# Patient Record
Sex: FEMALE | Race: WHITE | Employment: FULL TIME | URBAN - METROPOLITAN AREA
[De-identification: names, ages, dates, MRNs, and addresses within clinical notes are randomized per-mention and may not be internally consistent; named-entity substitution may affect disease eponyms.]

---

## 2018-10-13 ENCOUNTER — HOSPITAL ENCOUNTER (EMERGENCY)
Age: 38
Discharge: HOME | End: 2018-10-13
Payer: COMMERCIAL

## 2018-10-13 ENCOUNTER — APPOINTMENT (OUTPATIENT)
Dept: GENERAL RADIOLOGY | Age: 38
End: 2018-10-13
Payer: COMMERCIAL

## 2018-10-13 VITALS
WEIGHT: 102 LBS | SYSTOLIC BLOOD PRESSURE: 193 MMHG | TEMPERATURE: 98.5 F | DIASTOLIC BLOOD PRESSURE: 73 MMHG | HEIGHT: 63 IN | HEART RATE: 78 BPM | BODY MASS INDEX: 18.07 KG/M2 | RESPIRATION RATE: 14 BRPM | OXYGEN SATURATION: 100 %

## 2018-10-13 DIAGNOSIS — S92.514A CLOSED NONDISPLACED FRACTURE OF PROXIMAL PHALANX OF LESSER TOE OF RIGHT FOOT, INITIAL ENCOUNTER: Primary | ICD-10-CM

## 2018-10-13 PROCEDURE — 99283 EMERGENCY DEPT VISIT LOW MDM: CPT

## 2018-10-13 PROCEDURE — 6370000000 HC RX 637 (ALT 250 FOR IP): Performed by: NURSE PRACTITIONER

## 2018-10-13 PROCEDURE — 73630 X-RAY EXAM OF FOOT: CPT

## 2018-10-13 RX ORDER — IBUPROFEN 800 MG/1
800 TABLET ORAL EVERY 6 HOURS PRN
Qty: 16 TABLET | Refills: 0 | Status: SHIPPED | OUTPATIENT
Start: 2018-10-13 | End: 2021-07-21 | Stop reason: ALTCHOICE

## 2018-10-13 RX ORDER — HYDROCODONE BITARTRATE AND ACETAMINOPHEN 5; 325 MG/1; MG/1
1 TABLET ORAL EVERY 6 HOURS PRN
Qty: 12 TABLET | Refills: 0 | Status: SHIPPED | OUTPATIENT
Start: 2018-10-13 | End: 2018-10-16

## 2018-10-13 RX ORDER — IBUPROFEN 800 MG/1
800 TABLET ORAL ONCE
Status: COMPLETED | OUTPATIENT
Start: 2018-10-13 | End: 2018-10-13

## 2018-10-13 RX ADMIN — IBUPROFEN 800 MG: 800 TABLET ORAL at 11:12

## 2018-10-13 ASSESSMENT — PAIN SCALES - GENERAL
PAINLEVEL_OUTOF10: 10
PAINLEVEL_OUTOF10: 10

## 2018-10-13 ASSESSMENT — PAIN DESCRIPTION - PAIN TYPE: TYPE: ACUTE PAIN

## 2018-10-13 ASSESSMENT — PAIN DESCRIPTION - LOCATION: LOCATION: FOOT

## 2018-10-14 NOTE — ED PROVIDER NOTES
full range with pain. Skin:  tenderness, swelling and ecchymosis. Neurovascular: Motor deficit: none. Sensory deficit:   none. Pulse deficit: none. Capillary refill: normal.  Ankle:               Tenderness:  none. Swelling: None. Deformity: no.             ROM: full range of motion. Skin:  no erythema, rash or wounds noted. Gait:  limp. Lymphatics: No lymphangitis or adenopathy noted. Neurological:  Oriented. Motor functions intact. Lab / Imaging Results   (All laboratory and radiology results have been personally reviewed by myself)  Labs:  No results found for this visit on 10/13/18. Imaging: All Radiology results interpreted by Radiologist unless otherwise noted. XR FOOT RIGHT (MIN 3 VIEWS)   Final Result   Nondisplaced fracture of the proximal phalanx of the fifth   digit. ED Course / Medical Decision Making     Medications   ibuprofen (ADVIL;MOTRIN) tablet 800 mg (800 mg Oral Given 10/13/18 1112)        Consult(s):   none. Procedure(s):   none    Medical Decision Making:    Films were obtained based on high  suspicion for bony injury as per history/physical findings . Plan is subsequently for symptom control, limited use and  immobilization with appropriate outpatient follow-up. .Patient was rey taped and placed in a postop shoe. Patient given crutches and educated on their use. Patient verbalized understanding all questions were answered. Patient nontoxic in appearance no distress. Patient stable for discharge home with outpatient follow-up with podiatry. Counseling: The emergency provider has spoken with the patient and discussed todays results, in addition to providing specific details for the plan of care and counseling regarding the diagnosis and prognosis. Questions are answered at this time and they are agreeable with the plan. Assessment      1.  Closed nondisplaced

## 2018-10-19 ENCOUNTER — OFFICE VISIT (OUTPATIENT)
Dept: ORTHOPEDIC SURGERY | Age: 38
End: 2018-10-19
Payer: COMMERCIAL

## 2018-10-19 VITALS
WEIGHT: 102 LBS | SYSTOLIC BLOOD PRESSURE: 106 MMHG | HEART RATE: 66 BPM | BODY MASS INDEX: 18.07 KG/M2 | HEIGHT: 63 IN | DIASTOLIC BLOOD PRESSURE: 72 MMHG

## 2018-10-19 DIAGNOSIS — S99.921A FOOT INJURY, RIGHT, INITIAL ENCOUNTER: Primary | ICD-10-CM

## 2018-10-19 PROCEDURE — G8484 FLU IMMUNIZE NO ADMIN: HCPCS | Performed by: ORTHOPAEDIC SURGERY

## 2018-10-19 PROCEDURE — 99203 OFFICE O/P NEW LOW 30 MIN: CPT | Performed by: ORTHOPAEDIC SURGERY

## 2018-10-19 PROCEDURE — G8419 CALC BMI OUT NRM PARAM NOF/U: HCPCS | Performed by: ORTHOPAEDIC SURGERY

## 2018-10-19 PROCEDURE — 1036F TOBACCO NON-USER: CPT | Performed by: ORTHOPAEDIC SURGERY

## 2018-10-19 PROCEDURE — G8427 DOCREV CUR MEDS BY ELIG CLIN: HCPCS | Performed by: ORTHOPAEDIC SURGERY

## 2018-10-19 RX ORDER — M-VIT,TX,IRON,MINS/CALC/FOLIC 27MG-0.4MG
1 TABLET ORAL DAILY
COMMUNITY
End: 2018-11-19 | Stop reason: CLARIF

## 2018-10-19 NOTE — PROGRESS NOTES
New Ankle Patient     Referring Provider:   ED 10/12/18    CHIEF COMPLAINT:   Chief Complaint   Patient presents with   [facility] ED Follow-up     (R) foot pain, 10/12/18, she states that she hit the corner of a wall with her foot, on crutches and in post op shoe    Foot Pain             HPI:    Ramonita Tay is a 34y.o. year old female who is seen today  for evaluation of right toe pain. She was walking and conquer foot on the edge of the door and her small toe splayed laterally. She quickly push the toe back in. She went to the emergency room where x-ray showed a fracture of the proximal phalanx of the small toe on the right foot. The toe was rey taped and she was placed in a postop shoe. She has been weightbearing as tolerated since that time. Her pain has improved somewhat. . The patient is working. 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 PAST MEDICAL HISTORY  Past Medical History:   Diagnosis Date    Hernia        PAST SURGICAL HISTORY  Past Surgical History:   Procedure Laterality Date    BREAST ENHANCEMENT SURGERY       SECTION      HERNIA REPAIR           FAMILY HISTORY   History reviewed. No pertinent family history. SOCIAL HISTORY  Social History     Social History    Marital status:      Spouse name: N/A    Number of children: N/A    Years of education: N/A     Occupational History    Not on file. Social History Main Topics    Smoking status: Never Smoker    Smokeless tobacco: Never Used    Alcohol use No    Drug use: No    Sexual activity: Yes     Partners: Male     Other Topics Concern    Not on file     Social History Narrative    No narrative on file     Social History     Occupational History    Not on file.      Social History Main Topics    Smoking status: Never Smoker    Smokeless tobacco: Never Used    Alcohol use No    Drug use: No    Sexual activity: Yes     Partners: Male       CURRENT MEDICATIONS     Current

## 2018-11-19 ENCOUNTER — OFFICE VISIT (OUTPATIENT)
Dept: ORTHOPEDIC SURGERY | Age: 38
End: 2018-11-19
Payer: COMMERCIAL

## 2018-11-19 VITALS
WEIGHT: 102 LBS | HEART RATE: 78 BPM | DIASTOLIC BLOOD PRESSURE: 80 MMHG | HEIGHT: 63 IN | SYSTOLIC BLOOD PRESSURE: 127 MMHG | BODY MASS INDEX: 18.07 KG/M2

## 2018-11-19 DIAGNOSIS — S92.514D CLOSED NONDISPLACED FRACTURE OF PROXIMAL PHALANX OF LESSER TOE OF RIGHT FOOT WITH ROUTINE HEALING, SUBSEQUENT ENCOUNTER: Primary | ICD-10-CM

## 2018-11-19 PROCEDURE — 1036F TOBACCO NON-USER: CPT | Performed by: ORTHOPAEDIC SURGERY

## 2018-11-19 PROCEDURE — G8427 DOCREV CUR MEDS BY ELIG CLIN: HCPCS | Performed by: ORTHOPAEDIC SURGERY

## 2018-11-19 PROCEDURE — G8484 FLU IMMUNIZE NO ADMIN: HCPCS | Performed by: ORTHOPAEDIC SURGERY

## 2018-11-19 PROCEDURE — G8419 CALC BMI OUT NRM PARAM NOF/U: HCPCS | Performed by: ORTHOPAEDIC SURGERY

## 2018-11-19 PROCEDURE — 99213 OFFICE O/P EST LOW 20 MIN: CPT | Performed by: ORTHOPAEDIC SURGERY

## 2018-11-19 RX ORDER — ASCORBIC ACID, CHOLECALCIFEROL, .ALPHA.-TOCOPHEROL ACETATE, DL-, PYRIDOXINE HYDROCHLORIDE, FOLIC ACID, CYANOCOBALAMIN, BIOTIN, CALCIUM CARBONATE, FERROUS ASPARTO GLYCINATE, IRON, POTASSIUM IODIDE, MAGNESIUM OXIDE, DOCONEXENT AND LOWBUSH BLUEBERRY 60; 1000; 10; 26; 400; 13; 280; 80; 9; 9; 150; 25; 350; 25; 600 MG/1; [IU]/1; [IU]/1; MG/1; UG/1; UG/1; UG/1; MG/1; MG/1; MG/1; UG/1; MG/1; MG/1; MG/1; UG/1
CAPSULE, GELATIN COATED ORAL
Refills: 4 | COMMUNITY
Start: 2018-10-29 | End: 2018-11-19 | Stop reason: CLARIF

## 2020-05-06 ENCOUNTER — HOSPITAL ENCOUNTER (OUTPATIENT)
Age: 40
Discharge: HOME | End: 2020-05-08
Payer: COMMERCIAL

## 2020-05-06 PROCEDURE — 88305 TISSUE EXAM BY PATHOLOGIST: CPT

## 2020-05-21 PROBLEM — R87.612 LGSIL ON PAP SMEAR OF CERVIX: Status: ACTIVE | Noted: 2020-05-21

## 2020-05-21 PROBLEM — R87.810 CERVICAL HIGH RISK HUMAN PAPILLOMAVIRUS (HPV) DNA TEST POSITIVE: Status: ACTIVE | Noted: 2020-05-21

## 2021-07-20 ENCOUNTER — TELEPHONE (OUTPATIENT)
Dept: VASCULAR SURGERY | Age: 41
End: 2021-07-20

## 2021-07-21 ENCOUNTER — OFFICE VISIT (OUTPATIENT)
Dept: VASCULAR SURGERY | Age: 41
End: 2021-07-21
Payer: COMMERCIAL

## 2021-07-21 VITALS — SYSTOLIC BLOOD PRESSURE: 128 MMHG | DIASTOLIC BLOOD PRESSURE: 80 MMHG

## 2021-07-21 DIAGNOSIS — I80.9 SUPERFICIAL THROMBOPHLEBITIS, INVOLVING UNSPECIFIED SITE: Primary | ICD-10-CM

## 2021-07-21 PROCEDURE — 99203 OFFICE O/P NEW LOW 30 MIN: CPT | Performed by: SURGERY

## 2021-07-21 PROCEDURE — G8427 DOCREV CUR MEDS BY ELIG CLIN: HCPCS | Performed by: SURGERY

## 2021-07-21 PROCEDURE — G8420 CALC BMI NORM PARAMETERS: HCPCS | Performed by: SURGERY

## 2021-07-21 PROCEDURE — 1036F TOBACCO NON-USER: CPT | Performed by: SURGERY

## 2021-07-21 NOTE — PROGRESS NOTES
Vascular Surgery Outpatient Consultation        Reason for Consult:    Chief Complaint   Patient presents with   [facility] Surgical Consult     Superficial vein thrombosis left side of forehead, patient struck her head on the corner of a medicine cabinet. Area is enlarging and painful. Requesting Physician:  No referring provider defined for this encounter. Chief Complaint   Patient presents with    Surgical Consult     Superficial vein thrombosis left side of forehead, patient struck her head on the corner of a medicine cabinet. Area is enlarging and painful. HISTORY OF PRESENT ILLNESS:                The patient is a 41 y.o. female who is referred for evaluation of patient with a history of thrombophlebitis of the left side of her forehead. This was about a year ago. She hit the side of her head on the medicine cabinet. This was then accompanied by swelling pain and discomfort she had a significant amount of ecchymosis over that area and ultrasound demonstrated a superficial thrombophlebitis. At this point she describes some intermittent discomfort associated with the area and some bulging of some additional veins on her forehead. She has no history of blood clots or bleeding disorders. .    Past Medical History:        Diagnosis Date    Hernia      Past Surgical History:        Procedure Laterality Date    BREAST ENHANCEMENT SURGERY       SECTION      HERNIA REPAIR       Current Medications:   Prior to Admission medications    Medication Sig Start Date End Date Taking? Authorizing Provider   Levonorgest-Eth Estrad -Day [facility]) 0.15-0.03 &0.01 MG TABS Take 1 tablet by mouth daily 21  Yes Rosendo Perez MD     Allergies:  Patient has no known allergies.     Social History     Socioeconomic History    Marital status:      Spouse name: Not on file    Number of children: Not on file    Years of education: Not on file    Highest education level: Not on file   Occupational History    Not on file   Tobacco Use    Smoking status: Never Smoker    Smokeless tobacco: Never Used   Vaping Use    Vaping Use: Never used   Substance and Sexual Activity    Alcohol use: No    Drug use: No    Sexual activity: Yes     Partners: Male   Other Topics Concern    Not on file   Social History Narrative    Not on file     Social Determinants of Health     Financial Resource Strain:     Difficulty of Paying Living Expenses:    Food Insecurity:     Worried About Running Out of Food in the Last Year:     [address] in the Last Year:    Transportation Needs:     Lack of Transportation (Medical):  Lack of Transportation (Non-Medical):    Physical Activity:     Days of Exercise per Week:     Minutes of Exercise per Session:    Stress:     Feeling of Stress :    Social Connections:     Frequency of Communication with Friends and Family:     Frequency of Social Gatherings with Friends and Family:     Attends [employer] Services:     Active Member of Clubs or Organizations:     Attends Club or Organization Meetings:     Marital Status:    Intimate Partner Violence:     Fear of Current or Ex-Partner:     Emotionally Abused:     Physically Abused:     Sexually Abused:         History reviewed. No pertinent family history.     REVIEW OF SYSTEMS (New symptoms):    Eyes:      Blurred vision:  No [x]/Yes []               Diplopia:   No [x]/Yes []               Vision loss:       No [x]/Yes []   Ears, nose, throat:             Hearing loss:    No [x]/Yes []      Vertigo:   No [x]/Yes []                       Swallowing problem:  No [x]/Yes []               Nose bleeds:   No [x]/Yes []      Voice hoarseness:  No [x]/Yes []  Respiratory:             Cough:   No [x]/Yes []      Pleuritic chest pain:  No [x]/Yes []                        Dyspnea:   No [x]/Yes []      Wheezing:   No [x]/Yes []  Cardiovascular:             Angina:   No [x]/Yes []      Palpitations:   No [x]/Yes [] Claudication:    No [x]/Yes []      Leg swelling:   No [x]/Yes []  Gastrointestinal:             Nausea or vomiting:  No [x]/Yes []               Abdominal pain:  No [x]/Yes []                     Intestinal bleeding: No [x]/Yes []  Musculoskeletal:             Leg pain:   No [x]/Yes []      Back pain:   No [x]/Yes []                    Weakness:   No [x]/Yes []  Neurologic:             Numbness:   No [x]/Yes []      Paralysis:   No [x]/Yes []                       Headaches:   No [x]/Yes []  Hematologic, lymphatic:   Anemia:   No [x]/Yes []              Bleeding or bruising:  No [x]/Yes []              Fevers or chills: No [x]/Yes []  Endocrine:             Temp intolerance:   No [x]/Yes []                       Polydipsia, polyuria:  No [x]/Yes []  Skin:              Rash:    No [x]/Yes []      Ulcers:   No [x]/Yes []              Abnorm pigment: No [x]/Yes []  :              Frequency/urgency:  No [x]/Yes []      Hematuria:    No [x]/Yes []                      Incontinence:    No [x]/Yes []    PHYSICAL EXAM:  Vitals:    07/21/21 0905   BP: 128/80     General Appearance: alert and oriented to person, place and time, well developed and well- nourished, in no acute distress  Skin: warm and dry, no rash or erythema  Head: normocephalic and atraumatic she has some veins that are easily visualized however this patient is very thin. I feel no palpable cord. I will feel anything unusual with regards to her left side of her forehead.   Eyes: extraocular eye movements intact, conjunctivae normal  ENT: external ear and ear canal normal bilaterally, nose without deformity  Pulmonary/Chest: clear to auscultation bilaterally- no wheezes, rales or rhonchi, normal air movement, no respiratory distress  Cardiovascular: normal rate, regular rhythm, normal S1 and S2, no murmurs, no carotid bruits  Abdomen: soft, non-tender, non-distended, normal bowel sounds, no masses or organomegaly  Musculoskeletal: normal range of motion, no joint swelling, deformity or tenderness  Neurologic: no cranial nerve deficit, gait, coordination and speech normal  Extremities: Bilateral palpable brachial and radial pulses bilateral palpable DP and PT. Problem List Items Addressed This Visit     None      Visit Diagnoses     Superficial thrombophlebitis, involving unspecified site    -  Primary            #1 superficial thrombophlebitis approximately a year ago after a trauma to the left side of her forehead. At this point I really would not do anything other than symptomatic treatment with some Advil if she needs it. She is call if is any questions or concerns or worsening. No follow-ups on file.

## 2025-06-27 ENCOUNTER — HOSPITAL ENCOUNTER (OUTPATIENT)
Age: 45
Discharge: HOME | End: 2025-06-29

## 2025-06-27 PROCEDURE — 88302 TISSUE EXAM BY PATHOLOGIST: CPT

## 2025-07-02 LAB — SURGICAL PATHOLOGY REPORT: NORMAL
